# Patient Record
Sex: FEMALE | Race: WHITE | Employment: FULL TIME | URBAN - METROPOLITAN AREA
[De-identification: names, ages, dates, MRNs, and addresses within clinical notes are randomized per-mention and may not be internally consistent; named-entity substitution may affect disease eponyms.]

---

## 2019-02-20 LAB
CHOLESTEROL (EXTERNAL): 178 MG/DL (ref 100–199)
HDLC SERPL-MCNC: 55 MG/DL
HPV ABSTRACT: NORMAL
LDL CHOLESTEROL (EXTERNAL): 115 MG/DL
NON HDL CHOLESTEROL (EXTERNAL): 123 MG/DL
PAP-ABSTRACT: ABNORMAL
TRIGLYCERIDES (EXTERNAL): 40 MG/DL

## 2021-02-05 LAB
ABO (EXTERNAL): NORMAL
HEPATITIS B SURFACE ANTIGEN (EXTERNAL): NONREACTIVE
RH (EXTERNAL): POSITIVE
RUBELLA ANTIBODY IGG (EXTERNAL): POSITIVE

## 2021-02-12 LAB — CHLAMYDIA - HIM PATIENT REPORTED: NEGATIVE

## 2021-03-12 ENCOUNTER — MEDICAL CORRESPONDENCE (OUTPATIENT)
Dept: HEALTH INFORMATION MANAGEMENT | Facility: CLINIC | Age: 31
End: 2021-03-12

## 2021-03-12 ENCOUNTER — TRANSFERRED RECORDS (OUTPATIENT)
Dept: HEALTH INFORMATION MANAGEMENT | Facility: CLINIC | Age: 31
End: 2021-03-12

## 2021-03-12 ENCOUNTER — TRANSCRIBE ORDERS (OUTPATIENT)
Dept: MATERNAL FETAL MEDICINE | Facility: CLINIC | Age: 31
End: 2021-03-12

## 2021-03-12 DIAGNOSIS — O26.90 PREGNANCY RELATED CONDITION, ANTEPARTUM: Primary | ICD-10-CM

## 2021-03-15 ENCOUNTER — TRANSFERRED RECORDS (OUTPATIENT)
Dept: HEALTH INFORMATION MANAGEMENT | Facility: CLINIC | Age: 31
End: 2021-03-15

## 2021-03-29 ENCOUNTER — PRE VISIT (OUTPATIENT)
Dept: MATERNAL FETAL MEDICINE | Facility: CLINIC | Age: 31
End: 2021-03-29

## 2021-04-05 ENCOUNTER — OFFICE VISIT (OUTPATIENT)
Dept: MATERNAL FETAL MEDICINE | Facility: CLINIC | Age: 31
End: 2021-04-05
Attending: NURSE PRACTITIONER
Payer: COMMERCIAL

## 2021-04-05 ENCOUNTER — HOSPITAL ENCOUNTER (OUTPATIENT)
Dept: ULTRASOUND IMAGING | Facility: CLINIC | Age: 31
End: 2021-04-05
Attending: NURSE PRACTITIONER
Payer: COMMERCIAL

## 2021-04-05 DIAGNOSIS — O26.90 PREGNANCY RELATED CONDITION, ANTEPARTUM: ICD-10-CM

## 2021-04-05 DIAGNOSIS — O09.812 PREGNANCY RESULTING FROM IN VITRO FERTILIZATION IN SECOND TRIMESTER: Primary | ICD-10-CM

## 2021-04-05 PROCEDURE — 76811 OB US DETAILED SNGL FETUS: CPT

## 2021-04-05 PROCEDURE — 76811 OB US DETAILED SNGL FETUS: CPT | Mod: 26 | Performed by: OBSTETRICS & GYNECOLOGY

## 2021-04-05 NOTE — PROGRESS NOTES
Please see full imaging report from [facility] program under imaging tab.      Arcadio Garcia MD  Maternal Fetal Medicine

## 2021-05-06 ENCOUNTER — OFFICE VISIT (OUTPATIENT)
Dept: MATERNAL FETAL MEDICINE | Facility: CLINIC | Age: 31
End: 2021-05-06
Attending: NURSE PRACTITIONER
Payer: COMMERCIAL

## 2021-05-06 ENCOUNTER — HOSPITAL ENCOUNTER (OUTPATIENT)
Dept: ULTRASOUND IMAGING | Facility: CLINIC | Age: 31
End: 2021-05-06
Attending: NURSE PRACTITIONER
Payer: COMMERCIAL

## 2021-05-06 DIAGNOSIS — O09.812 PREGNANCY RESULTING FROM IN VITRO FERTILIZATION IN SECOND TRIMESTER: Primary | ICD-10-CM

## 2021-05-06 DIAGNOSIS — O26.90 PREGNANCY RELATED CONDITION, ANTEPARTUM: ICD-10-CM

## 2021-05-06 PROCEDURE — 93325 DOPPLER ECHO COLOR FLOW MAPG: CPT

## 2021-05-06 PROCEDURE — 93325 DOPPLER ECHO COLOR FLOW MAPG: CPT | Mod: 26 | Performed by: OBSTETRICS & GYNECOLOGY

## 2021-05-06 PROCEDURE — 76827 ECHO EXAM OF FETAL HEART: CPT | Mod: 26 | Performed by: OBSTETRICS & GYNECOLOGY

## 2021-05-06 PROCEDURE — 76825 ECHO EXAM OF FETAL HEART: CPT | Mod: 26 | Performed by: OBSTETRICS & GYNECOLOGY

## 2021-05-06 NOTE — PROGRESS NOTES
Please see the imaging tab for details of the ultrasound performed today.    Nadia Calhoun MD  Specialist in Maternal-Fetal Medicine

## 2021-08-06 LAB — GROUP B STREPTOCOCCUS (EXTERNAL): NEGATIVE

## 2021-08-13 ENCOUNTER — TRANSFERRED RECORDS (OUTPATIENT)
Dept: HEALTH INFORMATION MANAGEMENT | Facility: CLINIC | Age: 31
End: 2021-08-13

## 2021-08-13 LAB
ALT SERPL-CCNC: 50 IU/L (ref 12–68)
AST SERPL-CCNC: 24 IU/L (ref 12–37)
CREATININE (EXTERNAL): 0.57 MG/DL (ref 0.55–1.02)
GFR ESTIMATED (EXTERNAL): >60 ML/MIN
GFR ESTIMATED (IF AFRICAN AMERICAN) (EXTERNAL): >60 ML/MIN

## 2021-08-15 ENCOUNTER — HEALTH MAINTENANCE LETTER (OUTPATIENT)
Age: 31
End: 2021-08-15

## 2021-08-22 ENCOUNTER — LAB (OUTPATIENT)
Dept: LAB | Facility: CLINIC | Age: 31
End: 2021-08-22
Attending: OBSTETRICS & GYNECOLOGY
Payer: COMMERCIAL

## 2021-08-22 LAB — SARS-COV-2 RNA RESP QL NAA+PROBE: NEGATIVE

## 2021-08-22 PROCEDURE — U0005 INFEC AGEN DETEC AMPLI PROBE: HCPCS

## 2021-08-25 ENCOUNTER — HOSPITAL ENCOUNTER (INPATIENT)
Facility: CLINIC | Age: 31
LOS: 4 days | Discharge: HOME | End: 2021-08-29
Attending: OBSTETRICS & GYNECOLOGY | Admitting: OBSTETRICS & GYNECOLOGY
Payer: COMMERCIAL

## 2021-08-25 LAB
ABO/RH(D): NORMAL
ANTIBODY SCREEN: NEGATIVE
SPECIMEN EXPIRATION DATE: NORMAL

## 2021-08-25 PROCEDURE — 86780 TREPONEMA PALLIDUM: CPT | Performed by: OBSTETRICS & GYNECOLOGY

## 2021-08-25 PROCEDURE — 120N000001 HC R&B MED SURG/OB

## 2021-08-25 PROCEDURE — 250N000013 HC RX MED GY IP 250 OP 250 PS 637: Performed by: OBSTETRICS & GYNECOLOGY

## 2021-08-25 PROCEDURE — 36415 COLL VENOUS BLD VENIPUNCTURE: CPT | Performed by: OBSTETRICS & GYNECOLOGY

## 2021-08-25 PROCEDURE — 86900 BLOOD TYPING SEROLOGIC ABO: CPT | Performed by: OBSTETRICS & GYNECOLOGY

## 2021-08-25 RX ORDER — METHYLERGONOVINE MALEATE 0.2 MG/ML
200 INJECTION INTRAVENOUS
Status: DISCONTINUED | OUTPATIENT
Start: 2021-08-25 | End: 2021-08-27 | Stop reason: HOSPADM

## 2021-08-25 RX ORDER — NALOXONE HYDROCHLORIDE 0.4 MG/ML
0.4 INJECTION, SOLUTION INTRAMUSCULAR; INTRAVENOUS; SUBCUTANEOUS
Status: DISCONTINUED | OUTPATIENT
Start: 2021-08-25 | End: 2021-08-27 | Stop reason: HOSPADM

## 2021-08-25 RX ORDER — NALOXONE HYDROCHLORIDE 0.4 MG/ML
0.2 INJECTION, SOLUTION INTRAMUSCULAR; INTRAVENOUS; SUBCUTANEOUS
Status: DISCONTINUED | OUTPATIENT
Start: 2021-08-25 | End: 2021-08-27 | Stop reason: HOSPADM

## 2021-08-25 RX ORDER — LIDOCAINE 40 MG/G
CREAM TOPICAL
Status: DISCONTINUED | OUTPATIENT
Start: 2021-08-25 | End: 2021-08-27 | Stop reason: HOSPADM

## 2021-08-25 RX ORDER — OXYTOCIN/0.9 % SODIUM CHLORIDE 30/500 ML
100-340 PLASTIC BAG, INJECTION (ML) INTRAVENOUS CONTINUOUS PRN
Status: DISCONTINUED | OUTPATIENT
Start: 2021-08-25 | End: 2021-08-27

## 2021-08-25 RX ORDER — MISOPROSTOL 100 UG/1
25 TABLET ORAL
Status: COMPLETED | OUTPATIENT
Start: 2021-08-25 | End: 2021-08-26

## 2021-08-25 RX ORDER — OXYTOCIN 10 [USP'U]/ML
10 INJECTION, SOLUTION INTRAMUSCULAR; INTRAVENOUS
Status: DISCONTINUED | OUTPATIENT
Start: 2021-08-25 | End: 2021-08-27 | Stop reason: HOSPADM

## 2021-08-25 RX ORDER — PROCHLORPERAZINE MALEATE 5 MG
10 TABLET ORAL EVERY 6 HOURS PRN
Status: DISCONTINUED | OUTPATIENT
Start: 2021-08-25 | End: 2021-08-27 | Stop reason: HOSPADM

## 2021-08-25 RX ORDER — METOCLOPRAMIDE 10 MG/1
10 TABLET ORAL EVERY 6 HOURS PRN
Status: DISCONTINUED | OUTPATIENT
Start: 2021-08-25 | End: 2021-08-27 | Stop reason: HOSPADM

## 2021-08-25 RX ORDER — ASPIRIN 81 MG/1
81 TABLET, CHEWABLE ORAL EVERY 24 HOURS
Status: ON HOLD | COMMUNITY
End: 2021-08-29

## 2021-08-25 RX ORDER — MISOPROSTOL 200 UG/1
400 TABLET ORAL
Status: DISCONTINUED | OUTPATIENT
Start: 2021-08-25 | End: 2021-08-27 | Stop reason: HOSPADM

## 2021-08-25 RX ORDER — METOCLOPRAMIDE HYDROCHLORIDE 5 MG/ML
10 INJECTION INTRAMUSCULAR; INTRAVENOUS EVERY 6 HOURS PRN
Status: DISCONTINUED | OUTPATIENT
Start: 2021-08-25 | End: 2021-08-27 | Stop reason: HOSPADM

## 2021-08-25 RX ORDER — CARBOPROST TROMETHAMINE 250 UG/ML
250 INJECTION, SOLUTION INTRAMUSCULAR
Status: DISCONTINUED | OUTPATIENT
Start: 2021-08-25 | End: 2021-08-27 | Stop reason: HOSPADM

## 2021-08-25 RX ORDER — OXYTOCIN 10 [USP'U]/ML
10 INJECTION, SOLUTION INTRAMUSCULAR; INTRAVENOUS
Status: DISCONTINUED | OUTPATIENT
Start: 2021-08-25 | End: 2021-08-27

## 2021-08-25 RX ORDER — PROCHLORPERAZINE 25 MG
25 SUPPOSITORY, RECTAL RECTAL EVERY 12 HOURS PRN
Status: DISCONTINUED | OUTPATIENT
Start: 2021-08-25 | End: 2021-08-27 | Stop reason: HOSPADM

## 2021-08-25 RX ORDER — TRANEXAMIC ACID 10 MG/ML
1 INJECTION, SOLUTION INTRAVENOUS EVERY 30 MIN PRN
Status: DISCONTINUED | OUTPATIENT
Start: 2021-08-25 | End: 2021-08-27 | Stop reason: HOSPADM

## 2021-08-25 RX ORDER — OXYTOCIN/0.9 % SODIUM CHLORIDE 30/500 ML
340 PLASTIC BAG, INJECTION (ML) INTRAVENOUS CONTINUOUS PRN
Status: DISCONTINUED | OUTPATIENT
Start: 2021-08-25 | End: 2021-08-27 | Stop reason: HOSPADM

## 2021-08-25 RX ORDER — ONDANSETRON 4 MG/1
4 TABLET, ORALLY DISINTEGRATING ORAL EVERY 6 HOURS PRN
Status: DISCONTINUED | OUTPATIENT
Start: 2021-08-25 | End: 2021-08-27 | Stop reason: HOSPADM

## 2021-08-25 RX ORDER — KETOROLAC TROMETHAMINE 30 MG/ML
30 INJECTION, SOLUTION INTRAMUSCULAR; INTRAVENOUS
Status: DISCONTINUED | OUTPATIENT
Start: 2021-08-25 | End: 2021-08-27

## 2021-08-25 RX ORDER — ONDANSETRON 2 MG/ML
4 INJECTION INTRAMUSCULAR; INTRAVENOUS EVERY 6 HOURS PRN
Status: DISCONTINUED | OUTPATIENT
Start: 2021-08-25 | End: 2021-08-27 | Stop reason: HOSPADM

## 2021-08-25 RX ORDER — MISOPROSTOL 200 UG/1
800 TABLET ORAL
Status: DISCONTINUED | OUTPATIENT
Start: 2021-08-25 | End: 2021-08-27 | Stop reason: HOSPADM

## 2021-08-25 RX ORDER — LABETALOL 100 MG/1
100 TABLET, FILM COATED ORAL 2 TIMES DAILY
Status: ON HOLD | COMMUNITY
End: 2021-09-04

## 2021-08-25 RX ORDER — IBUPROFEN 600 MG/1
600 TABLET, FILM COATED ORAL
Status: DISCONTINUED | OUTPATIENT
Start: 2021-08-25 | End: 2021-08-27

## 2021-08-25 RX ORDER — HYDROXYZINE HYDROCHLORIDE 50 MG/1
50-100 TABLET, FILM COATED ORAL
Status: COMPLETED | OUTPATIENT
Start: 2021-08-25 | End: 2021-08-25

## 2021-08-25 RX ADMIN — MISOPROSTOL 25 MCG: 100 TABLET ORAL at 20:49

## 2021-08-25 RX ADMIN — MISOPROSTOL 25 MCG: 100 TABLET ORAL at 22:59

## 2021-08-25 RX ADMIN — HYDROXYZINE HYDROCHLORIDE 100 MG: 50 TABLET, FILM COATED ORAL at 23:02

## 2021-08-26 LAB
ALT SERPL W P-5'-P-CCNC: 41 U/L (ref 0–50)
ANION GAP SERPL CALCULATED.3IONS-SCNC: 4 MMOL/L (ref 3–14)
AST SERPL W P-5'-P-CCNC: 25 U/L (ref 0–45)
BUN SERPL-MCNC: 6 MG/DL (ref 7–30)
CALCIUM SERPL-MCNC: 8.5 MG/DL (ref 8.5–10.1)
CHLORIDE BLD-SCNC: 106 MMOL/L (ref 94–109)
CO2 SERPL-SCNC: 26 MMOL/L (ref 20–32)
CREAT SERPL-MCNC: 0.66 MG/DL (ref 0.52–1.04)
ERYTHROCYTE [DISTWIDTH] IN BLOOD BY AUTOMATED COUNT: 12.9 % (ref 10–15)
GFR SERPL CREATININE-BSD FRML MDRD: >90 ML/MIN/1.73M2
GLUCOSE BLD-MCNC: 71 MG/DL (ref 70–99)
HCT VFR BLD AUTO: 37.5 % (ref 35–47)
HGB BLD-MCNC: 12.7 G/DL (ref 11.7–15.7)
MCH RBC QN AUTO: 30.5 PG (ref 26.5–33)
MCHC RBC AUTO-ENTMCNC: 33.9 G/DL (ref 31.5–36.5)
MCV RBC AUTO: 90 FL (ref 78–100)
PLATELET # BLD AUTO: 191 10E3/UL (ref 150–450)
POTASSIUM BLD-SCNC: 4.1 MMOL/L (ref 3.4–5.3)
RBC # BLD AUTO: 4.16 10E6/UL (ref 3.8–5.2)
SODIUM SERPL-SCNC: 136 MMOL/L (ref 133–144)
T PALLIDUM AB SER QL: NONREACTIVE
WBC # BLD AUTO: 10.6 10E3/UL (ref 4–11)

## 2021-08-26 PROCEDURE — 80048 BASIC METABOLIC PNL TOTAL CA: CPT | Performed by: OBSTETRICS & GYNECOLOGY

## 2021-08-26 PROCEDURE — 84460 ALANINE AMINO (ALT) (SGPT): CPT | Performed by: OBSTETRICS & GYNECOLOGY

## 2021-08-26 PROCEDURE — 250N000013 HC RX MED GY IP 250 OP 250 PS 637: Performed by: OBSTETRICS & GYNECOLOGY

## 2021-08-26 PROCEDURE — 120N000001 HC R&B MED SURG/OB

## 2021-08-26 PROCEDURE — 36415 COLL VENOUS BLD VENIPUNCTURE: CPT | Performed by: OBSTETRICS & GYNECOLOGY

## 2021-08-26 PROCEDURE — 85027 COMPLETE CBC AUTOMATED: CPT | Performed by: OBSTETRICS & GYNECOLOGY

## 2021-08-26 PROCEDURE — 258N000003 HC RX IP 258 OP 636: Performed by: OBSTETRICS & GYNECOLOGY

## 2021-08-26 PROCEDURE — 84450 TRANSFERASE (AST) (SGOT): CPT | Performed by: OBSTETRICS & GYNECOLOGY

## 2021-08-26 PROCEDURE — 250N000009 HC RX 250: Performed by: OBSTETRICS & GYNECOLOGY

## 2021-08-26 RX ORDER — LIDOCAINE 40 MG/G
CREAM TOPICAL
Status: DISCONTINUED | OUTPATIENT
Start: 2021-08-26 | End: 2021-08-27 | Stop reason: HOSPADM

## 2021-08-26 RX ORDER — LABETALOL 100 MG/1
100 TABLET, FILM COATED ORAL EVERY 12 HOURS SCHEDULED
Status: DISCONTINUED | OUTPATIENT
Start: 2021-08-26 | End: 2021-08-27

## 2021-08-26 RX ORDER — SODIUM CHLORIDE, SODIUM LACTATE, POTASSIUM CHLORIDE, CALCIUM CHLORIDE 600; 310; 30; 20 MG/100ML; MG/100ML; MG/100ML; MG/100ML
INJECTION, SOLUTION INTRAVENOUS CONTINUOUS
Status: DISCONTINUED | OUTPATIENT
Start: 2021-08-26 | End: 2021-08-27 | Stop reason: HOSPADM

## 2021-08-26 RX ORDER — OXYTOCIN/0.9 % SODIUM CHLORIDE 30/500 ML
1-24 PLASTIC BAG, INJECTION (ML) INTRAVENOUS CONTINUOUS
Status: DISCONTINUED | OUTPATIENT
Start: 2021-08-26 | End: 2021-08-27 | Stop reason: HOSPADM

## 2021-08-26 RX ADMIN — SODIUM CHLORIDE, POTASSIUM CHLORIDE, SODIUM LACTATE AND CALCIUM CHLORIDE: 600; 310; 30; 20 INJECTION, SOLUTION INTRAVENOUS at 22:46

## 2021-08-26 RX ADMIN — LABETALOL HYDROCHLORIDE 100 MG: 100 TABLET, FILM COATED ORAL at 19:51

## 2021-08-26 RX ADMIN — MISOPROSTOL 25 MCG: 100 TABLET ORAL at 03:06

## 2021-08-26 RX ADMIN — MISOPROSTOL 25 MCG: 100 TABLET ORAL at 13:29

## 2021-08-26 RX ADMIN — MISOPROSTOL 25 MCG: 100 TABLET ORAL at 04:54

## 2021-08-26 RX ADMIN — MISOPROSTOL 25 MCG: 100 TABLET ORAL at 11:25

## 2021-08-26 RX ADMIN — Medication 2 MILLI-UNITS/MIN: at 23:02

## 2021-08-26 RX ADMIN — MISOPROSTOL 25 MCG: 100 TABLET ORAL at 09:27

## 2021-08-26 RX ADMIN — MISOPROSTOL 25 MCG: 100 TABLET ORAL at 19:51

## 2021-08-26 RX ADMIN — MISOPROSTOL 25 MCG: 100 TABLET ORAL at 17:43

## 2021-08-26 RX ADMIN — LABETALOL HYDROCHLORIDE 100 MG: 100 TABLET, FILM COATED ORAL at 08:10

## 2021-08-26 RX ADMIN — MISOPROSTOL 25 MCG: 100 TABLET ORAL at 00:59

## 2021-08-26 RX ADMIN — MISOPROSTOL 25 MCG: 100 TABLET ORAL at 15:27

## 2021-08-26 RX ADMIN — MISOPROSTOL 25 MCG: 100 TABLET ORAL at 07:16

## 2021-08-26 ASSESSMENT — ACTIVITIES OF DAILY LIVING (ADL)
FALL_HISTORY_WITHIN_LAST_SIX_MONTHS: NO
TOILETING_ISSUES: NO

## 2021-08-26 NOTE — PLAN OF CARE
Assumed care at 715, report received from Jacqueline BULLOCK.  Valerie is a 31 year old, , 38w6d, being induced for chronic HTN. GBS negative, Covid negative. Patient denies headache, epigastric pain, visual disturbances.  Membranes intact, received 6th dose of po cytotec at 0716.  Pt plans to call  when labor progresse and eventually get an epidural.

## 2021-08-26 NOTE — PLAN OF CARE
Data: Patient admitted to room 213 at 1945. Patient is a . Prenatal record reviewed.   OB History    Para Term  AB Living   1 0 0 0 0 0   SAB TAB Ectopic Multiple Live Births   0 0 0 0 0      # Outcome Date GA Lbr Stephan/2nd Weight Sex Delivery Anes PTL Lv   1 Current            .  Medical History:   Past Medical History:   Diagnosis Date     Hypertension     Chronic HTN   .  Gestational age 38w5d. Vital signs per doc flowsheet. Fetal movement present. Patient reports Induction Of Labor   as reason for admission. Support persons Geena present.  Action:  Verbal consent for EFM, denies skin reactions to adhesives, nehemiah monitor applied. Admission assessment completed. SVE /-3. Patient and support persons educated on labor process. Patient instructed to report change in fetal movement, contractions, vaginal leaking of fluid or bleeding, abdominal pain, or any concerns related to the pregnancy to her nurse/physician. Patient oriented to room, call light in reach.   Response: Dr. Chicas informed of patient arrival. Plan per provider is Oral cytotec x12 doses. Hypertensive labs ordered for morning. Patient verbalized understanding of education and verbalized agreement with plan. Patient coping with labor via rest.

## 2021-08-26 NOTE — PLAN OF CARE
Problem: Adult Inpatient Plan of Care  Goal: Plan of Care Review  Outcome: No Change  Goal: Patient-Specific Goal (Individualized)  Outcome: No Change  Goal: Absence of Hospital-Acquired Illness or Injury  Outcome: No Change  Goal: Optimal Comfort and Wellbeing  Outcome: No Change  Intervention: Provide Person-Centered Care  Recent Flowsheet Documentation  Taken 8/26/2021 1100 by Nora Salvador RN  Trust Relationship/Rapport:   care explained   choices provided   empathic listening provided   questions answered   questions encouraged   reassurance provided   thoughts/feelings acknowledged   emotional support provided  Taken 8/26/2021 0800 by Nora Salvador RN  Trust Relationship/Rapport:   care explained   choices provided   empathic listening provided   questions answered   questions encouraged   reassurance provided   thoughts/feelings acknowledged   emotional support provided  Goal: Readiness for Transition of Care  Outcome: No Change     Problem: Bleeding (Labor)  Goal: Hemostasis  Outcome: No Change     Problem: Change in Fetal Wellbeing (Labor)  Goal: Stable Fetal Wellbeing  Outcome: No Change     Problem: Delayed Labor Progression (Labor)  Goal: Effective Progression to Delivery  Outcome: No Change     Problem: Infection (Labor)  Goal: Absence of Infection Signs and Symptoms  Outcome: No Change     Problem: Labor Pain (Labor)  Goal: Acceptable Pain Control  Outcome: No Change     Problem: Uterine Tachysystole (Labor)  Goal: Normal Uterine Contraction Pattern  Outcome: No Change     Patient received 4 doses of po cytotec.

## 2021-08-26 NOTE — H&P
"OB Brief Admit H&P    No significant change in general health status based on examination of the patient, review of Nursing Admission Database and prenatal record.    Pt is a 31 year old  @ 38w6d who presented to L&D for IOL due to CHTN. S/p 6 doses of oral cytotec overnight and feeling cramping. Denies LOF or VB. Baby active. Denies HA, blurry vision, RUQ pain.    Patient's prenatal course has been complicated by:  1. CTHN - on labetalol 100 mg BID  2. IVF - donor egg from wife  3. H/o ureteral reimplantation surgery - low abdominal scar, prefers we use this if  needed.  4. Elevated 1 hour, normal 3 hour.    Prenatal Labs:    Blood type A+  Rubella immune  , normal 3 hour  GBS  Negative 21    EFW: 7 lbs    BP (!) 144/80   Temp 98.4  F (36.9  C) (Temporal)   Resp 17   Ht 1.753 m (5' 9\")   Wt 90.7 kg (200 lb)   BMI 29.53 kg/m    EFM:  140, moderate variability, ++accels, no decels (Category )  [place]: Q1-4  SVE: 0/60%/-2  Membranes:  intact  Lab Results   Component Value Date    WBC 10.6 2021    HGB 12.7 2021    HCT 37.5 2021    MCV 90 2021     2021     Lab Results   Component Value Date    CR 0.66 2021     Lab Results   Component Value Date    AST 25 2021    ALT 41 2021     Most recent P/C ratio 21 too low to calculate    COVID PCR negative 21    Assessment:  31 year old  @ 38w6d admitted for IOL secondary to CHTN on medication.     Plan:  1. Admit to labor and delivery   2. Labor induction  - Continue ripening with oral cytotec, progress has been made overnight  - Plan AROM / pitocin when appropriate  - Ok for epidural  - Anticipate   3. CHTN  - continue home medications  - No evidence of superimposed preeclampsia at this time.  4. ID  - Neg GBS  - Neg Covid   - S/p Tdap & Covid vaccines this pregnancy.    Jo-Ann Weiss MD  2021  9:51 AM      "

## 2021-08-26 NOTE — PROGRESS NOTES
"OB Progress Note  2021  3:02 PM    S:  Pt with no medication for  pain control. Was just up to the bathroom and then felt leaking fluid - blood tinged.  No other complaints. Receiving oral cytotec x 9.      O:  BP (!) 141/94   Temp 98  F (36.7  C) (Temporal)   Resp 18   Ht 1.753 m (5' 9\")   Wt 90.7 kg (200 lb)   BMI 29.53 kg/m     Vitals:    21 0727 21 0747 21 0835 21 1123   BP: (!) 144/80   (!) 141/94   Resp: 17   18   Temp: 98.4  F (36.9  C)   98  F (36.7  C)   TempSrc: Temporal   Temporal   Weight:  90.7 kg (200 lb)     Height:   1.753 m (5' 9\")      EFM: baseline 140, accelerations present, variable decelerations, moderate variability; Category 2  [place]:  Ctx q2-3 min  SVE:  FT/60%/-2, posterior  Membranes: SROM, clear blood tinged at 2:55 pm    Pitocin at 0 mU/min    A/P:  31 year old  @38w6d admitted for IOL secondary to CHTN.    1.  Routine cares  2.  Now s/p SROM. Transition to pitocin after 12 doses of oral cytotec. Ok for epidural.  3.  CHTN   - continue home medications  - No evidence of superimposed preeclampsia at this time.  4. Anticipate     Jo-Ann Weiss MD    "

## 2021-08-27 ENCOUNTER — ANESTHESIA (OUTPATIENT)
Dept: OBGYN | Facility: CLINIC | Age: 31
End: 2021-08-27
Payer: COMMERCIAL

## 2021-08-27 ENCOUNTER — ANESTHESIA EVENT (OUTPATIENT)
Dept: OBGYN | Facility: CLINIC | Age: 31
End: 2021-08-27
Payer: COMMERCIAL

## 2021-08-27 PROCEDURE — 120N000012 HC R&B POSTPARTUM

## 2021-08-27 PROCEDURE — 250N000011 HC RX IP 250 OP 636: Performed by: ANESTHESIOLOGY

## 2021-08-27 PROCEDURE — 00HU33Z INSERTION OF INFUSION DEVICE INTO SPINAL CANAL, PERCUTANEOUS APPROACH: ICD-10-PCS | Performed by: ANESTHESIOLOGY

## 2021-08-27 PROCEDURE — 250N000013 HC RX MED GY IP 250 OP 250 PS 637: Performed by: OBSTETRICS & GYNECOLOGY

## 2021-08-27 PROCEDURE — 258N000003 HC RX IP 258 OP 636: Performed by: OBSTETRICS & GYNECOLOGY

## 2021-08-27 PROCEDURE — 722N000001 HC LABOR CARE VAGINAL DELIVERY SINGLE

## 2021-08-27 PROCEDURE — 3E0R3BZ INTRODUCTION OF ANESTHETIC AGENT INTO SPINAL CANAL, PERCUTANEOUS APPROACH: ICD-10-PCS | Performed by: ANESTHESIOLOGY

## 2021-08-27 PROCEDURE — 250N000009 HC RX 250: Performed by: OBSTETRICS & GYNECOLOGY

## 2021-08-27 PROCEDURE — 0HQ9XZZ REPAIR PERINEUM SKIN, EXTERNAL APPROACH: ICD-10-PCS | Performed by: OBSTETRICS & GYNECOLOGY

## 2021-08-27 RX ORDER — FENTANYL CITRATE 50 UG/ML
100 INJECTION, SOLUTION INTRAMUSCULAR; INTRAVENOUS ONCE
Status: DISCONTINUED | OUTPATIENT
Start: 2021-08-27 | End: 2021-08-27 | Stop reason: HOSPADM

## 2021-08-27 RX ORDER — OXYTOCIN 10 [USP'U]/ML
10 INJECTION, SOLUTION INTRAMUSCULAR; INTRAVENOUS
Status: DISCONTINUED | OUTPATIENT
Start: 2021-08-27 | End: 2021-08-29 | Stop reason: HOSPADM

## 2021-08-27 RX ORDER — OXYTOCIN/0.9 % SODIUM CHLORIDE 30/500 ML
340 PLASTIC BAG, INJECTION (ML) INTRAVENOUS CONTINUOUS PRN
Status: DISCONTINUED | OUTPATIENT
Start: 2021-08-27 | End: 2021-08-29 | Stop reason: HOSPADM

## 2021-08-27 RX ORDER — MISOPROSTOL 200 UG/1
400 TABLET ORAL
Status: DISCONTINUED | OUTPATIENT
Start: 2021-08-27 | End: 2021-08-29 | Stop reason: HOSPADM

## 2021-08-27 RX ORDER — METHYLERGONOVINE MALEATE 0.2 MG/ML
200 INJECTION INTRAVENOUS
Status: DISCONTINUED | OUTPATIENT
Start: 2021-08-27 | End: 2021-08-29 | Stop reason: HOSPADM

## 2021-08-27 RX ORDER — ROPIVACAINE HYDROCHLORIDE 2 MG/ML
10 INJECTION, SOLUTION EPIDURAL; INFILTRATION; PERINEURAL ONCE
Status: DISCONTINUED | OUTPATIENT
Start: 2021-08-27 | End: 2021-08-27 | Stop reason: HOSPADM

## 2021-08-27 RX ORDER — IBUPROFEN 400 MG/1
800 TABLET, FILM COATED ORAL EVERY 6 HOURS PRN
Status: DISCONTINUED | OUTPATIENT
Start: 2021-08-27 | End: 2021-08-29 | Stop reason: HOSPADM

## 2021-08-27 RX ORDER — ACETAMINOPHEN 325 MG/1
975 TABLET ORAL EVERY 4 HOURS PRN
Status: DISCONTINUED | OUTPATIENT
Start: 2021-08-27 | End: 2021-08-27

## 2021-08-27 RX ORDER — MISOPROSTOL 200 UG/1
800 TABLET ORAL
Status: DISCONTINUED | OUTPATIENT
Start: 2021-08-27 | End: 2021-08-29 | Stop reason: HOSPADM

## 2021-08-27 RX ORDER — FENTANYL CITRATE 50 UG/ML
INJECTION, SOLUTION INTRAMUSCULAR; INTRAVENOUS
Status: COMPLETED | OUTPATIENT
Start: 2021-08-27 | End: 2021-08-27

## 2021-08-27 RX ORDER — CARBOPROST TROMETHAMINE 250 UG/ML
250 INJECTION, SOLUTION INTRAMUSCULAR
Status: DISCONTINUED | OUTPATIENT
Start: 2021-08-27 | End: 2021-08-29 | Stop reason: HOSPADM

## 2021-08-27 RX ORDER — TRANEXAMIC ACID 10 MG/ML
1 INJECTION, SOLUTION INTRAVENOUS EVERY 30 MIN PRN
Status: DISCONTINUED | OUTPATIENT
Start: 2021-08-27 | End: 2021-08-29 | Stop reason: HOSPADM

## 2021-08-27 RX ORDER — ACETAMINOPHEN 325 MG/1
650 TABLET ORAL EVERY 4 HOURS PRN
Status: DISCONTINUED | OUTPATIENT
Start: 2021-08-27 | End: 2021-08-29 | Stop reason: HOSPADM

## 2021-08-27 RX ORDER — BISACODYL 10 MG
10 SUPPOSITORY, RECTAL RECTAL DAILY PRN
Status: DISCONTINUED | OUTPATIENT
Start: 2021-08-27 | End: 2021-08-29 | Stop reason: HOSPADM

## 2021-08-27 RX ORDER — DOCUSATE SODIUM 100 MG/1
100 CAPSULE, LIQUID FILLED ORAL DAILY
Status: DISCONTINUED | OUTPATIENT
Start: 2021-08-27 | End: 2021-08-29 | Stop reason: HOSPADM

## 2021-08-27 RX ORDER — MODIFIED LANOLIN
OINTMENT (GRAM) TOPICAL
Status: DISCONTINUED | OUTPATIENT
Start: 2021-08-27 | End: 2021-08-29 | Stop reason: HOSPADM

## 2021-08-27 RX ORDER — HYDROCORTISONE 2.5 %
CREAM (GRAM) TOPICAL 3 TIMES DAILY PRN
Status: DISCONTINUED | OUTPATIENT
Start: 2021-08-27 | End: 2021-08-29 | Stop reason: HOSPADM

## 2021-08-27 RX ORDER — ROPIVACAINE HYDROCHLORIDE 2 MG/ML
INJECTION, SOLUTION EPIDURAL; INFILTRATION; PERINEURAL
Status: COMPLETED | OUTPATIENT
Start: 2021-08-27 | End: 2021-08-27

## 2021-08-27 RX ORDER — NALBUPHINE HYDROCHLORIDE 10 MG/ML
2.5-5 INJECTION, SOLUTION INTRAMUSCULAR; INTRAVENOUS; SUBCUTANEOUS EVERY 6 HOURS PRN
Status: DISCONTINUED | OUTPATIENT
Start: 2021-08-27 | End: 2021-08-27

## 2021-08-27 RX ORDER — EPHEDRINE SULFATE 50 MG/ML
5 INJECTION, SOLUTION INTRAMUSCULAR; INTRAVENOUS; SUBCUTANEOUS
Status: DISCONTINUED | OUTPATIENT
Start: 2021-08-27 | End: 2021-08-27 | Stop reason: HOSPADM

## 2021-08-27 RX ORDER — LABETALOL 100 MG/1
100 TABLET, FILM COATED ORAL EVERY 12 HOURS SCHEDULED
Status: DISCONTINUED | OUTPATIENT
Start: 2021-08-27 | End: 2021-08-29 | Stop reason: HOSPADM

## 2021-08-27 RX ADMIN — SODIUM CHLORIDE, POTASSIUM CHLORIDE, SODIUM LACTATE AND CALCIUM CHLORIDE: 600; 310; 30; 20 INJECTION, SOLUTION INTRAVENOUS at 18:12

## 2021-08-27 RX ADMIN — Medication 12 ML/HR: at 17:45

## 2021-08-27 RX ADMIN — DOCUSATE SODIUM 100 MG: 100 CAPSULE, LIQUID FILLED ORAL at 22:25

## 2021-08-27 RX ADMIN — TRANEXAMIC ACID 1 G: 10 INJECTION, SOLUTION INTRAVENOUS at 19:40

## 2021-08-27 RX ADMIN — ROPIVACAINE HYDROCHLORIDE 10 ML: 2 INJECTION, SOLUTION EPIDURAL; INFILTRATION at 01:25

## 2021-08-27 RX ADMIN — LABETALOL HYDROCHLORIDE 100 MG: 100 TABLET, FILM COATED ORAL at 07:47

## 2021-08-27 RX ADMIN — SODIUM CHLORIDE, POTASSIUM CHLORIDE, SODIUM LACTATE AND CALCIUM CHLORIDE: 600; 310; 30; 20 INJECTION, SOLUTION INTRAVENOUS at 01:40

## 2021-08-27 RX ADMIN — Medication 12 ML/HR: at 09:18

## 2021-08-27 RX ADMIN — SODIUM CHLORIDE, POTASSIUM CHLORIDE, SODIUM LACTATE AND CALCIUM CHLORIDE 1000 ML: 600; 310; 30; 20 INJECTION, SOLUTION INTRAVENOUS at 00:57

## 2021-08-27 RX ADMIN — SODIUM CHLORIDE, POTASSIUM CHLORIDE, SODIUM LACTATE AND CALCIUM CHLORIDE: 600; 310; 30; 20 INJECTION, SOLUTION INTRAVENOUS at 09:41

## 2021-08-27 RX ADMIN — LABETALOL HYDROCHLORIDE 100 MG: 100 TABLET, FILM COATED ORAL at 22:25

## 2021-08-27 RX ADMIN — FENTANYL CITRATE 100 MCG: 50 INJECTION, SOLUTION INTRAMUSCULAR; INTRAVENOUS at 01:25

## 2021-08-27 RX ADMIN — Medication 12 ML/HR: at 01:33

## 2021-08-27 RX ADMIN — Medication 100 ML/HR: at 20:00

## 2021-08-27 RX ADMIN — ACETAMINOPHEN 975 MG: 325 TABLET, FILM COATED ORAL at 11:11

## 2021-08-27 RX ADMIN — IBUPROFEN 800 MG: 400 TABLET ORAL at 21:16

## 2021-08-27 RX ADMIN — ACETAMINOPHEN 650 MG: 325 TABLET, FILM COATED ORAL at 21:16

## 2021-08-27 NOTE — PROGRESS NOTES
"OB Progress Note  2021  6:11 PM    S:  Pushing on side with epidural.     O:  /72   Temp 98.2  F (36.8  C) (Temporal)   Resp 16   Ht 1.753 m (5' 9\")   Wt 90.7 kg (200 lb)   SpO2 96%   BMI 29.53 kg/m    EFM: baseline 140, accelerations absent, no decelerations on her side, did have recurrent decels when pushing on her back, moderate variability; Category II  [place]:  Ctx q2-3 min  SVE:  10/100%/+2  Membranes: ruptured at 2:55pm on 21    Pitocin at 6 mU/min    A/P:  31 year old  @39w0d admitted for IOL for chronic HTN  1.  Routine cares, OK to continue pushing at this time  2.  CHTN: BP's WNL  3.  Anticipate     Joi Parker MD    "

## 2021-08-27 NOTE — PROVIDER NOTIFICATION
08/26/21 2218   Provider Notification   Provider Name/Title Dr Chung   Method of Notification Electronic Page   Request Evaluate - Remote   Notification Reason Status Update     Spoke with MD on the phone. PO cytotec order now complete. Patient off the monitor to shower. Orders received to start pitocin.

## 2021-08-27 NOTE — ANESTHESIA PROCEDURE NOTES
Epidural catheter Procedure Note  Pre-Procedure   Staff -        Anesthesiologist:  Ramandeep Lovell MD       Performed By: anesthesiologist       Location: OB       Pre-Anesthestic Checklist: patient identified, IV checked, site marked, risks and benefits discussed, informed consent, monitors and equipment checked, pre-op evaluation and at physician/surgeon's request  Timeout:       Correct Patient: Yes        Correct Procedure: Yes        Correct Site: Yes        Correct Position: Yes   Procedure Documentation  Procedure: epidural catheter       Patient Position: sitting       Skin prep: Betadine       Local skin infiltrated with 1 mL of 1% lidocaine.        Insertion Site: L2-3. (midline approach).       Technique: LORT saline and LORT air        Needle Type: Touhy needle       Needle Gauge: 17.        Needle Length (Inches): 3.5        Catheter: 19 G.         Catheter threaded easily.        # of attempts: 1 and  # of redirects:     Assessment/Narrative         Paresthesias: No.       Test dose of 3 mL lidocaine 1.5% w/ 1:200,000 epinephrine at 01:23 CDT.         Test dose negative, 3 minutes after injection, for signs of intravascular, subdural, or intrathecal injection.       Insertion/Infusion Method: LORT saline and LORT air       Aspiration negative for Heme or CSF via Epidural Catheter.    Medication(s) Administered   0.2% Ropivacaine (Epidural), 10 mL  Fentanyl PF (Epidural), 100 mcg  Medication Administration Time: 8/27/2021 1:25 AM    Comments:  Pre-procedure time out completed. Patient in sitting position, the lumbar spine was prepped and draped in sterile fashion. The L2/L3 interspace was identified and local anesthetic was injected for local skin infiltration. A 17 G touhy needle was advanced to the epidural space which was confirmed with the loss of resistance technique at 4.5 cm. A catheter was then advanced easily into the epidural space. The catheter was left at 8.5 cm at the skin. Negative  aspiration of blood and CSF was confirmed. A test dose of 1.5% lidocaine with 1:200,000 epinephrine was injected through the catheter and was negative for intravascular injection. The site was covered with sterile tegaderm and the catheter was secured with tape.

## 2021-08-27 NOTE — PROGRESS NOTES
"OB Progress Note  2021  8:40 AM    S:  Pt with good pain control.  No other complaints.      O:  /59   Temp 98.4  F (36.9  C) (Temporal)   Resp 16   Ht 1.753 m (5' 9\")   Wt 90.7 kg (200 lb)   SpO2 95%   BMI 29.53 kg/m    EFM: baseline 140, accelerations +, none decelerations, mod variability; Category 1  [place]:  Ctx q2 min  SVE:  5/80%/0  Membranes: SROM     Pitocin at 10 mU/min    A/P:  31 year old  @39w0d admitted with Chtn  1.  Routine cares: continue Induction process, monitor cervical change, we can consider internal uterine monitoring but not necessary if we can see the pattern well externally.   2.  bp stable   3.  Anticipate     Darwin Chicas DO    "

## 2021-08-27 NOTE — ANESTHESIA PREPROCEDURE EVALUATION
Anesthesia Pre-Procedure Evaluation    Patient: Valerie Holder   MRN: [id number] : 1990        Preoperative Diagnosis: * No surgery found *   Procedure :      Past Medical History:   Diagnosis Date     Hypertension     Chronic HTN      History reviewed. No pertinent surgical history.   No Known Allergies   Social History     Tobacco Use     Smoking status: Never Smoker   Substance Use Topics     Alcohol use: Not Currently      Wt Readings from Last 1 Encounters:   21 90.7 kg (200 lb)        No Known Allergies  Prior to Admission medications    Medication Sig Start Date End Date Taking? Authorizing Provider   aspirin (ASA) 81 MG chewable tablet 81 mg every 24 hours   Yes Reported, Patient   Cholecalciferol (VITAMIN D3) 25 MCG (1000 UT) CAPS Vitamin D3 21  Yes Reported, Patient   labetalol (NORMODYNE) 100 MG tablet 100 mg 2 times daily   Yes Reported, Patient       Anesthesia Evaluation            ROS/MED HX  ENT/Pulmonary:  - neg pulmonary ROS     Neurologic:  - neg neurologic ROS     Cardiovascular:     (+) hypertension--PIH ---    METS/Exercise Tolerance:     Hematologic:  - neg hematologic  ROS     Musculoskeletal:       GI/Hepatic:  - neg GI/hepatic ROS     Renal/Genitourinary:       Endo:  - neg endo ROS     Psychiatric/Substance Use:  - neg psychiatric ROS     Infectious Disease:       Malignancy:       Other:            Physical Exam    Airway        Mallampati: II   TM distance: > 3 FB   Neck ROM: full   Mouth opening: > 3 cm    Respiratory Devices and Support         Dental  no notable dental history         Cardiovascular   cardiovascular exam normal          Pulmonary   pulmonary exam normal                OUTSIDE LABS:  CBC:   Lab Results   Component Value Date    WBC 10.6 2021    HGB 12.7 2021    HCT 37.5 2021     2021     BMP:   Lab Results   Component Value Date     2021    POTASSIUM 4.1 2021    CHLORIDE 106 2021    CO2  26 08/26/2021    BUN 6 (L) 08/26/2021    CR 0.66 08/26/2021    GLC 71 08/26/2021     COAGS: No results found for: PTT, INR, FIBR  POC: No results found for: BGM, HCG, HCGS  HEPATIC:   Lab Results   Component Value Date    ALT 41 08/26/2021    AST 25 08/26/2021     OTHER:   Lab Results   Component Value Date    [place]ISON 8.5 08/26/2021       Anesthesia Plan    ASA Status:  2      Anesthesia Type: Epidural.              Consents    Anesthesia Plan(s) and associated risks, benefits, and realistic alternatives discussed. Questions answered and patient/representative(s) expressed understanding.     - Discussed with:  Patient         Postoperative Care            Comments:           neg OB ROS.       Ramandeep Lovell MD

## 2021-08-27 NOTE — PLAN OF CARE
Patient used many position changes, the shower, and nitrous oxide to help cope with early labor. Patient then requested and received an epidural. She has been comfortable since receiving an epidural. Remains afebrile and BPs WDL.

## 2021-08-28 LAB — HGB BLD-MCNC: 11.9 G/DL (ref 11.7–15.7)

## 2021-08-28 PROCEDURE — 120N000012 HC R&B POSTPARTUM

## 2021-08-28 PROCEDURE — 85018 HEMOGLOBIN: CPT | Performed by: OBSTETRICS & GYNECOLOGY

## 2021-08-28 PROCEDURE — 36415 COLL VENOUS BLD VENIPUNCTURE: CPT | Performed by: OBSTETRICS & GYNECOLOGY

## 2021-08-28 PROCEDURE — 250N000013 HC RX MED GY IP 250 OP 250 PS 637: Performed by: OBSTETRICS & GYNECOLOGY

## 2021-08-28 RX ADMIN — IBUPROFEN 800 MG: 400 TABLET ORAL at 02:51

## 2021-08-28 RX ADMIN — LABETALOL HYDROCHLORIDE 100 MG: 100 TABLET, FILM COATED ORAL at 09:01

## 2021-08-28 RX ADMIN — ACETAMINOPHEN 650 MG: 325 TABLET, FILM COATED ORAL at 02:51

## 2021-08-28 RX ADMIN — ACETAMINOPHEN 650 MG: 325 TABLET, FILM COATED ORAL at 15:41

## 2021-08-28 RX ADMIN — DOCUSATE SODIUM 100 MG: 100 CAPSULE, LIQUID FILLED ORAL at 09:00

## 2021-08-28 RX ADMIN — ACETAMINOPHEN 650 MG: 325 TABLET, FILM COATED ORAL at 09:00

## 2021-08-28 RX ADMIN — IBUPROFEN 800 MG: 400 TABLET ORAL at 22:06

## 2021-08-28 RX ADMIN — IBUPROFEN 800 MG: 400 TABLET ORAL at 09:00

## 2021-08-28 RX ADMIN — ACETAMINOPHEN 650 MG: 325 TABLET, FILM COATED ORAL at 22:05

## 2021-08-28 RX ADMIN — IBUPROFEN 800 MG: 400 TABLET ORAL at 15:42

## 2021-08-28 RX ADMIN — LABETALOL HYDROCHLORIDE 100 MG: 100 TABLET, FILM COATED ORAL at 20:11

## 2021-08-28 NOTE — ANESTHESIA POSTPROCEDURE EVALUATION
Patient: Valerie Holder    * No procedures listed *    Diagnosis:* No pre-op diagnosis entered *  Diagnosis Additional Information: No value filed.    Anesthesia Type:  No value filed.    Note:  Disposition: Inpatient   Postop Pain Control: Uneventful            Sign Out: Well controlled pain   PONV: No   Neuro/Psych: Uneventful            Sign Out: Acceptable/Baseline neuro status   Airway/Respiratory: Uneventful            Sign Out: Acceptable/Baseline resp. status   CV/Hemodynamics: Uneventful            Sign Out: Acceptable CV status; No obvious hypovolemia; No obvious fluid overload   Other NRE: NONE   DID A NON-ROUTINE EVENT OCCUR? No           Last vitals:  Vitals Value Taken Time   BP     Temp     Pulse     Resp     SpO2         Electronically Signed By: Mark Olsen MD  August 28, 2021  5:04 PM

## 2021-08-28 NOTE — LACTATION NOTE
Routine Lactation visit with Valerie, significant other Geena & baby boy Jj. Valerie reports feeding is going well so far, working on getting a deep latch with each feed. Discussed typical early feeding patterns in first 24 hours, encouraged continuing to work on feeds at least every 2-3 hours. At time of visit, cecy Gardner due to try to feed. Valerie brought him to left breast with LC assist in cross cradle hold. He took a few tries to latch but was able to get a deep latch with LC assisting to sandwich breast. Tried both cross cradle and practiced football hold on left breast. Able to get a deeper, more comfortable latch in football. Several audible swallows heard during feeding. She's using her own nipple cream after each feeding, encouraged continued use.    Reviewed milk supply and engorgement. General questions answered regarding when to expect mature milk, pumping, when to introduce a bottle. Breastfeeding section reviewed in Your Guide to Postpartum [facility] [place] [facility]. Discussed typical  feeding patterns, cluster feeding, and ways to wake a sleepy baby for feedings.    Feeding plan: Recommend unlimited, frequent breast feedings: At least 8 - 12 times every 24 hours. Avoid pacifiers and supplementation with formula unless medically indicated. Encouraged use of feeding log and to record feedings, and void/stool patterns. Valerie has a pump for home use.  Encouraged to call with needs, will revisit as needed. Valerie & Geena appreciative of visit.    Radha Romero, RN-C, IBCLC, MNN, PHN, BSN

## 2021-08-28 NOTE — PLAN OF CARE
Pt's VSS, up ambulating independently, voiding adequately, fundus firm, scant lochia noted. Pain managed with ibuprofen and tylenol. IV SL in place, Hgb ordered for AM draw. Pt breastfeeding infant well with minimal assistance. S.O. supportive to patient and infant at bedside.

## 2021-08-28 NOTE — PLAN OF CARE
Report received from Barb Michel RN and post delivery care of the patient assumed. Placenta delivered at 1901. Initial fundal check WNL.     Dr. Parker called back to the room to evaluate a steady trickle of vaginal bleeding. Fundus is firm and down two from the umbilicus and no increased bleeding with fundal massage.     Report given to Becca Burton RN to assume care.

## 2021-08-28 NOTE — PROGRESS NOTES
Post-partum Note      S: Patient is doing well today.  Pain is controlled with PO medications.  Tolerating regular diet without nausea or vomiting.  Ambulating without dizziness.  Urinating without difficulty. Lochia normal.  Breastfeeding.    O:   Patient Vitals for the past 24 hrs:   BP Temp Temp src Pulse Resp SpO2   08/28/21 0805 129/80 97.6  F (36.4  C) Oral 62 16 --   08/28/21 0210 128/81 98.3  F (36.8  C) Axillary 64 16 --   08/27/21 2145 132/73 98.5  F (36.9  C) Oral 69 16 --   08/27/21 2050 139/76 -- -- -- -- 97 %   08/27/21 2035 137/69 -- -- -- -- 97 %   08/27/21 2020 132/81 -- -- -- -- 97 %   08/27/21 2005 132/75 99.6  F (37.6  C) Temporal -- 16 97 %   08/27/21 2000 132/75 -- -- -- -- 97 %   08/27/21 1935 138/70 -- -- -- -- 97 %   08/27/21 1920 137/63 -- -- -- -- 97 %   08/27/21 1900 (!) 142/67 -- -- -- -- 96 %   08/27/21 1830 -- -- -- -- -- 98 %   08/27/21 1815 -- 97.4  F (36.3  C) Temporal -- 16 98 %   08/27/21 1800 (!) 140/83 -- -- -- -- 94 %   08/27/21 1745 -- -- -- -- -- 95 %   08/27/21 1730 (!) 150/68 -- -- -- 16 96 %   08/27/21 1700 (!) 141/86 98.7  F (37.1  C) Temporal -- 16 96 %   08/27/21 1630 (!) 141/95 -- -- -- -- 94 %   08/27/21 1600 132/87 -- -- -- -- 96 %   08/27/21 1530 129/72 -- -- -- 16 96 %   08/27/21 1500 130/77 98.2  F (36.8  C) Temporal -- 16 96 %   08/27/21 1430 139/63 -- -- -- -- 95 %   08/27/21 1415 -- 98  F (36.7  C) Temporal -- -- --   08/27/21 1400 126/67 -- -- -- -- 95 %   08/27/21 1332 124/75 -- -- -- -- 95 %   08/27/21 1330 -- -- -- -- -- 94 %   08/27/21 1305 125/81 -- -- -- -- 94 %   08/27/21 1300 -- 98.4  F (36.9  C) Temporal -- -- 96 %   08/27/21 1230 123/73 -- -- -- 16 96 %   08/27/21 1200 114/69 98.8  F (37.1  C) Temporal -- 16 96 %   08/27/21 1130 131/80 -- -- -- -- 96 %   08/27/21 1100 129/80 -- -- -- -- 95 %   08/27/21 1055 -- 99.6  F (37.6  C) Temporal -- -- --   08/27/21 1030 109/58 -- -- -- -- 96 %   08/27/21 1003 118/63 -- -- -- -- 95 %   08/27/21 1000 -- 98.5  F  (36.9  C) Temporal -- 16 95 %   21 0930 128/73 -- -- -- -- 96 %   21 0900 131/81 98.8  F (37.1  C) Temporal -- -- 95 %     Gen:  Resting comfortably, NAD  Pulm:  Breathing comfortably on room air  Abd:  Soft, appropriately ttp, non-distended.Fundus below umbilicus, firm and non-tender.  Ext:  non-tender, no bilateral LE edema    I/O last 3 completed shifts:  In: -   Out: 3559 [Urine:3300; Blood:259]    Hgb:   Hemoglobin   Date Value Ref Range Status   2021 11.9 11.7 - 15.7 g/dL Final       Assessment/Plan:  31 year old  on PPD #1 s/p  after IOL for CHTN.  1. Continue with routine postpartum management  2. CHTN: continue labetalol 100mg BID. Monitor BP closely.   3. No signs or symptoms of acute blood loss anemia.  4. Rh: Positive  5. Feed: Breastfeeding  Dispo: MA home PPD#2. Warning signs reviewed. Follow-up in 1 week for BP check. Patient will continue checking BP once daily at home as well and notify us for elevated or low BPs    Stacy Ocasio MD  [facility]  2021, 8:56 AM

## 2021-08-28 NOTE — PLAN OF CARE
Pt with epidural throughout day which has been effective for pain control.  Pt complete at 1500 and began pushing with contractions at 1610.  Pt had decel to 80's when pushing began, Dr Chicas called and asked to come assess pt.  Dr Chicas remained at bedside throughout pushing.  Pt pushed effectively on her left side throughout pushing and delivered a viable male at 1840.

## 2021-08-28 NOTE — PLAN OF CARE
VSS, breastfeeding, fundus is firm at U/1, scant flow, no clots.  Voided x 1.  Independent with cares.  SO at bedside and supportive.  Will continue to monitor and support.

## 2021-08-28 NOTE — PLAN OF CARE
VSS, breastfeeding, fundus is firm at U/2, scant flow, no clots.  Pain controlled with Tylenol and Ibuprofen.  Independent with cares.  SO at bedside and supportive.  Will continue to monitor and support.

## 2021-08-28 NOTE — L&D DELIVERY NOTE
OB Delivery Summary      HISTORY OF PRESENT ILLNESS:   with  IUP @ 39w0d who presented for IOL at 38w5d for cHTN.  Her prenatal course was  complicated by chronic HTN (on labetalol 100mg BID), IVF pregnancy, and elevated 1hr but normal 3hr GCT.  Prenatal labs were significant for blood type A+, rubella status immune.  Glucose challenge test 151 (normal 3hr).  Group beta strep culture was negative.  Estimated fetal weight on admission was approximately 7lb.        FIRST STAGE OF LABOR:  The patient was admitted to Labor and Delivery with a fetal heart rate tracing that was category I. Her cervix was closed and she received oral cytotec for cervical ripening x 12 doses, then Pitocin for induction. SROM occurred at 2:55pm on 21 and she received an epidural for pain control at her request and gradually progressed in labor through the night. Pitocin was gradually increased and she became completely dilated at 3pm on 21.       SECOND STAGE OF LABOR:  The patient began pushing at 4:10pm.  She gradually brought the vertex down in the birth canal and delivered a viable male infant at 6:40pm on 21. There were variable decelerations with every contraction during pushing and she pushed on her side. After delivery of the head, the anterior shoulder and body delivered without difficulty.  No nuchal cord was noted. The infant was placed on the mother's chest.  Delayed cord clamping was performed.        THIRD STAGE OF LABOR:  The cord was clamped and cut. After 20min, the placenta did not spontaneously deliver with gentle traction. Verbal consent was obtained for manual extraction. The placenta then delivered after manual extraction and appeared intact with a 3-vessel cord at 7:01pm.  Pitocin was started and fundal massage was performed.  Perineum was inspected and a first degree laceration was noted. It was repaired with a running suture of 3-0 Vicryl. A slow trickle of blood was noted despite fundal massage,  and a speculum was used to examine the vaginal tract and cervix. No additional lacerations were noted. A dose of IV tranexamic acid was given. Quantitative blood loss for the delivery was 243cc.       Both mother and baby tolerated delivery well and there were no complications. Fetal weight was 1fc32dv and Apgars were 8 and 9 at 1 and 5 minutes, respectively.       Joi Parker MD  8/27/2021  7:13 PM

## 2021-08-28 NOTE — PLAN OF CARE
Data: Valerie Holder transferred to [facility] via wheelchair at 2140. Baby transferred via parent's arms.  Action: Receiving unit notified of transfer: Yes. Patient and family notified of room change. Report given to Merlene Kincaid RN at 2140. Belongings sent to receiving unit. Accompanied by Registered Nurse. Oriented patient to surroundings. Call light within reach. ID bands double-checked with receiving RN.  Response: Patient tolerated transfer and is stable.

## 2021-08-29 VITALS
OXYGEN SATURATION: 97 % | RESPIRATION RATE: 16 BRPM | BODY MASS INDEX: 29.62 KG/M2 | WEIGHT: 200 LBS | HEIGHT: 69 IN | HEART RATE: 66 BPM | DIASTOLIC BLOOD PRESSURE: 91 MMHG | TEMPERATURE: 97.7 F | SYSTOLIC BLOOD PRESSURE: 142 MMHG

## 2021-08-29 PROCEDURE — 250N000013 HC RX MED GY IP 250 OP 250 PS 637: Performed by: OBSTETRICS & GYNECOLOGY

## 2021-08-29 RX ADMIN — LABETALOL HYDROCHLORIDE 100 MG: 100 TABLET, FILM COATED ORAL at 08:17

## 2021-08-29 RX ADMIN — IBUPROFEN 800 MG: 400 TABLET ORAL at 04:03

## 2021-08-29 RX ADMIN — ACETAMINOPHEN 650 MG: 325 TABLET, FILM COATED ORAL at 04:03

## 2021-08-29 RX ADMIN — IBUPROFEN 800 MG: 400 TABLET ORAL at 10:12

## 2021-08-29 RX ADMIN — ACETAMINOPHEN 650 MG: 325 TABLET, FILM COATED ORAL at 10:12

## 2021-08-29 RX ADMIN — DOCUSATE SODIUM 100 MG: 100 CAPSULE, LIQUID FILLED ORAL at 08:17

## 2021-08-29 NOTE — PLAN OF CARE
Pt's VSS, up ambulating independently, voiding adequately, fundus firm, scant lochia. Pain managed with ibuprofen and tylenol. Pt breastfeeding infant well, spouse at bedside, supportive to both patient and infant.

## 2021-08-29 NOTE — PLAN OF CARE
VSS and fundus firm.  Patient states pain adequately managed with PRN pain medications.  Patient discharged to home. Discharge instructions, self care, and reasons to call doctor reviewed with patient.  Rx reviewed, verified and given to patient.  Patient verbalizes understanding to make follow-up appointment as directed by physician.  Patient states no questions with discharge.  Hug and Kisses tags removed.

## 2021-08-29 NOTE — PROGRESS NOTES
Post-partum Note      S: Patient is doing well today.  Pain is controlled with PO medications.  Tolerating regular diet without nausea or vomiting.  Ambulating without dizziness.  Urinating without difficulty. Lochia normal.  Breastfeeding.    O:   Patient Vitals for the past 24 hrs:   BP Temp Temp src Pulse Resp   21 0812 (!) 142/91 97.7  F (36.5  C) Oral 66 16   21 2305 132/83 97.6  F (36.4  C) Axillary 62 16   21 123/71 -- -- 62 --   21 1545 125/72 97.7  F (36.5  C) Oral 62 14     Gen:  Resting comfortably, NAD  Pulm:  Breathing comfortably on room air  Abd:  Soft, appropriately ttp, non-distended.Fundus below umbilicus, firm and non-tender.  :  Healing well, first degree laceration healing well  Ext:  non-tender, no bilateral LE edema    Hgb:   Hemoglobin   Date Value Ref Range Status   2021 11.9 11.7 - 15.7 g/dL Final       Assessment/Plan:  31 year old  on PPD #2 s/p  after IOL for CHTN.  1. Continue with routine postpartum management  2. CHTN: continue labetalol 100mg BID. Monitor BP closely.   3. No signs or symptoms of acute blood loss anemia.  4. Rh: Positive  5. Feed: Breastfeeding  Dispo: [place] home PPD#2. Warning signs reviewed. Follow-up in 1 week for BP check. Patient will continue checking BP once daily at home as well and notify us for elevated or low BPs    MD Naseem [facility]  2021 8:31 AM

## 2021-09-02 ENCOUNTER — HOSPITAL ENCOUNTER (OUTPATIENT)
Facility: CLINIC | Age: 31
End: 2021-09-02
Attending: OBSTETRICS & GYNECOLOGY | Admitting: OBSTETRICS & GYNECOLOGY
Payer: COMMERCIAL

## 2021-09-02 ENCOUNTER — HOSPITAL ENCOUNTER (INPATIENT)
Facility: CLINIC | Age: 31
LOS: 2 days | Discharge: HOME | DRG: 776 | End: 2021-09-04
Attending: OBSTETRICS & GYNECOLOGY | Admitting: OBSTETRICS & GYNECOLOGY
Payer: COMMERCIAL

## 2021-09-02 LAB
ABO/RH(D): NORMAL
ALT SERPL W P-5'-P-CCNC: 52 U/L (ref 0–50)
ANTIBODY SCREEN: NEGATIVE
AST SERPL W P-5'-P-CCNC: 23 U/L (ref 0–45)
CREAT SERPL-MCNC: 0.51 MG/DL (ref 0.52–1.04)
ERYTHROCYTE [DISTWIDTH] IN BLOOD BY AUTOMATED COUNT: 12.7 % (ref 10–15)
GFR SERPL CREATININE-BSD FRML MDRD: >90 ML/MIN/1.73M2
HCT VFR BLD AUTO: 36.3 % (ref 35–47)
HGB BLD-MCNC: 12.1 G/DL (ref 11.7–15.7)
MCH RBC QN AUTO: 30 PG (ref 26.5–33)
MCHC RBC AUTO-ENTMCNC: 33.3 G/DL (ref 31.5–36.5)
MCV RBC AUTO: 90 FL (ref 78–100)
PLATELET # BLD AUTO: 270 10E3/UL (ref 150–450)
RBC # BLD AUTO: 4.03 10E6/UL (ref 3.8–5.2)
SPECIMEN EXPIRATION DATE: NORMAL
WBC # BLD AUTO: 8.8 10E3/UL (ref 4–11)

## 2021-09-02 PROCEDURE — 258N000003 HC RX IP 258 OP 636: Performed by: OBSTETRICS & GYNECOLOGY

## 2021-09-02 PROCEDURE — 82565 ASSAY OF CREATININE: CPT | Performed by: OBSTETRICS & GYNECOLOGY

## 2021-09-02 PROCEDURE — 36415 COLL VENOUS BLD VENIPUNCTURE: CPT | Performed by: OBSTETRICS & GYNECOLOGY

## 2021-09-02 PROCEDURE — 86900 BLOOD TYPING SEROLOGIC ABO: CPT | Performed by: OBSTETRICS & GYNECOLOGY

## 2021-09-02 PROCEDURE — 250N000011 HC RX IP 250 OP 636

## 2021-09-02 PROCEDURE — 250N000011 HC RX IP 250 OP 636: Performed by: OBSTETRICS & GYNECOLOGY

## 2021-09-02 PROCEDURE — 84460 ALANINE AMINO (ALT) (SGPT): CPT | Performed by: OBSTETRICS & GYNECOLOGY

## 2021-09-02 PROCEDURE — 84450 TRANSFERASE (AST) (SGOT): CPT | Performed by: OBSTETRICS & GYNECOLOGY

## 2021-09-02 PROCEDURE — 250N000013 HC RX MED GY IP 250 OP 250 PS 637: Performed by: OBSTETRICS & GYNECOLOGY

## 2021-09-02 PROCEDURE — 85027 COMPLETE CBC AUTOMATED: CPT | Performed by: OBSTETRICS & GYNECOLOGY

## 2021-09-02 PROCEDURE — 120N000012 HC R&B POSTPARTUM

## 2021-09-02 RX ORDER — CALCIUM GLUCONATE 94 MG/ML
1 INJECTION, SOLUTION INTRAVENOUS
Status: DISCONTINUED | OUTPATIENT
Start: 2021-09-02 | End: 2021-09-04 | Stop reason: HOSPADM

## 2021-09-02 RX ORDER — MAGNESIUM SULFATE HEPTAHYDRATE 40 MG/ML
4 INJECTION, SOLUTION INTRAVENOUS ONCE
Status: COMPLETED | OUTPATIENT
Start: 2021-09-02 | End: 2021-09-02

## 2021-09-02 RX ORDER — IBUPROFEN 400 MG/1
400 TABLET, FILM COATED ORAL EVERY 6 HOURS PRN
COMMUNITY

## 2021-09-02 RX ORDER — MAGNESIUM SULFATE HEPTAHYDRATE 40 MG/ML
4 INJECTION, SOLUTION INTRAVENOUS
Status: DISCONTINUED | OUTPATIENT
Start: 2021-09-02 | End: 2021-09-04 | Stop reason: HOSPADM

## 2021-09-02 RX ORDER — MAGNESIUM SULFATE HEPTAHYDRATE 40 MG/ML
2 INJECTION, SOLUTION INTRAVENOUS
Status: DISCONTINUED | OUTPATIENT
Start: 2021-09-02 | End: 2021-09-04 | Stop reason: HOSPADM

## 2021-09-02 RX ORDER — SODIUM CHLORIDE, SODIUM LACTATE, POTASSIUM CHLORIDE, CALCIUM CHLORIDE 600; 310; 30; 20 MG/100ML; MG/100ML; MG/100ML; MG/100ML
10-125 INJECTION, SOLUTION INTRAVENOUS CONTINUOUS
Status: DISCONTINUED | OUTPATIENT
Start: 2021-09-02 | End: 2021-09-04 | Stop reason: HOSPADM

## 2021-09-02 RX ORDER — PRENATAL VIT/IRON FUM/FOLIC AC 27MG-0.8MG
1 TABLET ORAL DAILY
COMMUNITY

## 2021-09-02 RX ORDER — LABETALOL HYDROCHLORIDE 5 MG/ML
20 INJECTION, SOLUTION INTRAVENOUS ONCE
Status: DISCONTINUED | OUTPATIENT
Start: 2021-09-02 | End: 2021-09-04 | Stop reason: HOSPADM

## 2021-09-02 RX ORDER — ACETAMINOPHEN 325 MG/1
650 TABLET ORAL EVERY 4 HOURS PRN
Status: DISCONTINUED | OUTPATIENT
Start: 2021-09-02 | End: 2021-09-04 | Stop reason: HOSPADM

## 2021-09-02 RX ORDER — LABETALOL 100 MG/1
100 TABLET, FILM COATED ORAL EVERY 12 HOURS SCHEDULED
Status: DISCONTINUED | OUTPATIENT
Start: 2021-09-02 | End: 2021-09-03

## 2021-09-02 RX ORDER — ACETAMINOPHEN 500 MG
500-1000 TABLET ORAL EVERY 6 HOURS PRN
COMMUNITY

## 2021-09-02 RX ORDER — IBUPROFEN 400 MG/1
400-800 TABLET, FILM COATED ORAL EVERY 6 HOURS PRN
Status: DISCONTINUED | OUTPATIENT
Start: 2021-09-02 | End: 2021-09-04 | Stop reason: HOSPADM

## 2021-09-02 RX ORDER — MAGNESIUM SULFATE HEPTAHYDRATE 500 MG/ML
10 INJECTION, SOLUTION INTRAMUSCULAR; INTRAVENOUS
Status: DISCONTINUED | OUTPATIENT
Start: 2021-09-02 | End: 2021-09-04 | Stop reason: HOSPADM

## 2021-09-02 RX ORDER — LORAZEPAM 2 MG/ML
2 INJECTION INTRAMUSCULAR
Status: DISCONTINUED | OUTPATIENT
Start: 2021-09-02 | End: 2021-09-04 | Stop reason: HOSPADM

## 2021-09-02 RX ORDER — MAGNESIUM SULFATE IN WATER 40 MG/ML
2 INJECTION, SOLUTION INTRAVENOUS CONTINUOUS
Status: DISCONTINUED | OUTPATIENT
Start: 2021-09-02 | End: 2021-09-03

## 2021-09-02 RX ORDER — DOCUSATE SODIUM 100 MG/1
100 CAPSULE, LIQUID FILLED ORAL 2 TIMES DAILY
COMMUNITY

## 2021-09-02 RX ORDER — NIFEDIPINE 10 MG/1
10-20 CAPSULE ORAL
Status: DISCONTINUED | OUTPATIENT
Start: 2021-09-02 | End: 2021-09-04 | Stop reason: HOSPADM

## 2021-09-02 RX ORDER — MAGNESIUM SULFATE HEPTAHYDRATE 40 MG/ML
INJECTION, SOLUTION INTRAVENOUS
Status: COMPLETED
Start: 2021-09-02 | End: 2021-09-02

## 2021-09-02 RX ADMIN — LABETALOL HYDROCHLORIDE 100 MG: 100 TABLET, FILM COATED ORAL at 19:40

## 2021-09-02 RX ADMIN — MAGNESIUM SULFATE IN WATER 2 G/HR: 40 INJECTION, SOLUTION INTRAVENOUS at 11:22

## 2021-09-02 RX ADMIN — MAGNESIUM SULFATE IN WATER 2 G/HR: 40 INJECTION, SOLUTION INTRAVENOUS at 21:24

## 2021-09-02 RX ADMIN — SODIUM CHLORIDE, POTASSIUM CHLORIDE, SODIUM LACTATE AND CALCIUM CHLORIDE 125 ML/HR: 600; 310; 30; 20 INJECTION, SOLUTION INTRAVENOUS at 10:47

## 2021-09-02 RX ADMIN — ACETAMINOPHEN 650 MG: 325 TABLET, FILM COATED ORAL at 18:39

## 2021-09-02 RX ADMIN — ACETAMINOPHEN 650 MG: 325 TABLET, FILM COATED ORAL at 23:19

## 2021-09-02 RX ADMIN — MAGNESIUM SULFATE HEPTAHYDRATE 4 G: 40 INJECTION, SOLUTION INTRAVENOUS at 10:48

## 2021-09-02 RX ADMIN — ACETAMINOPHEN 650 MG: 325 TABLET, FILM COATED ORAL at 14:29

## 2021-09-02 RX ADMIN — SODIUM CHLORIDE, POTASSIUM CHLORIDE, SODIUM LACTATE AND CALCIUM CHLORIDE 75 ML/HR: 600; 310; 30; 20 INJECTION, SOLUTION INTRAVENOUS at 23:12

## 2021-09-02 NOTE — PLAN OF CARE
"Patient sent over from clinic after spot checking her blood pressures last evening/night with values creeping up.  Pt called on-call MD, Dr. Chicas.  Dr. Susan Chung saw her in the clinic this morning and sent her to hospital for direct admit for preeclampsia eval and magnesium infusion.    Pt is here with her wife, Geena, and their son, Jj. Pt complaining of mild headache and some right upper quadrant pain \"3\".  Labs have been collected.  Initial /91.  BP at home 164/100, BP in clinic 170/100.  Reflexes brisk (+3), no clonus.      Magnisium Sulfate 4 gram load started in MAC.  "

## 2021-09-02 NOTE — PLAN OF CARE
Pt is 6 days post partum.  She had a first degree vaginal lac.  She reports feeling well.  Breastfeeding her son jJ is going well.

## 2021-09-02 NOTE — PLAN OF CARE
Transferred to room 416 from MAC 1 via wheelchair at 1140.  Wife Merit and Max present.  Report given to Jill Parikh RN, at 1140.

## 2021-09-02 NOTE — PLAN OF CARE
Pt arrived on floor at 1140 with baby, partner and personal belongings. Magnesium sulfate infusing at 2gm/hr, LR at 75ml/hr. /93, c/o mild HA, epigastric pain, denies vision changes. Mild swelling. Reflexes 3+, no clonus. SBA to BR, voiding well. Dr. Chung updated on pt. labs and plan is to stay on Labetalol 100 mg BID, VS every 4 hrs. and call if /100 or greater. Pt. had her Labetalol dose this am. Tylenol for pain rating of 3/10. Breastfeeding her baby boy who is rooming in. Rooming in agreement signed.

## 2021-09-02 NOTE — H&P
OB Brief Admit H&P    Pt is a 31 year old  PPD#6 s/p  who presented to clinic complaining of mild headache and elevated blood pressures. Of note pregnancy has been complicated by chronic hypertension on labetalol 100 BID.  She called in last night complaining of headache and /100. Took labetalol early in AM and BPs at home improved to 140/90s. On presentation to clinic today however BPs 170/100 x2 with continued mild headache.  Additionally patient reports mild RUQ pain that can wrap around to her back. Denies any vision changes, chest pain, shortness of breath. Patient recommended to proceed to the hospital for admission and IV magnesium.    Labor and delivery course was notable for long cervical ripening course followed by SROM and progression. She delivered vaginally after pushing for 2.5 hours, 7 lb14 oz baby boy. Placenta was manually extracted. 1st degree laceration.  ml. A single does of IV TXA was given.  Postpartum course was notable for mild range blood pressures, her labetalol 100 mg BID was continued during that time. She was discharged home on PPD#2.      Vitals:    21 1120 21 1125 21 1143 21 1400   BP: 138/80 (!) 143/92 (!) 133/93    Pulse:   68    Resp: 16  18    Temp:       TempSrc:       Weight:    84.2 kg (185 lb 9.6 oz)        GEN: NAD  CV: well perfused  Pulm: Non labored breathing    Results for orders placed or performed during the hospital encounter of 21   CBC with platelets     Status: Normal   Result Value Ref Range    WBC Count 8.8 4.0 - 11.0 10e3/uL    RBC Count 4.03 3.80 - 5.20 10e6/uL    Hemoglobin 12.1 11.7 - 15.7 g/dL    Hematocrit 36.3 35.0 - 47.0 %    MCV 90 78 - 100 fL    MCH 30.0 26.5 - 33.0 pg    MCHC 33.3 31.5 - 36.5 g/dL    RDW 12.7 10.0 - 15.0 %    Platelet Count 270 150 - 450 10e3/uL   AST     Status: Normal   Result Value Ref Range    AST 23 0 - 45 U/L   ALT     Status: Abnormal   Result Value Ref Range    ALT 52 (H) 0 -  50 U/L   Creatinine     Status: Abnormal   Result Value Ref Range    Creatinine 0.51 (L) 0.52 - 1.04 mg/dL    GFR Estimate >90 >60 mL/min/1.73m2   Adult Type and Screen     Status: None   Result Value Ref Range    ABO/RH(D) A POS     Antibody Screen Negative Negative    SPECIMEN EXPIRATION DATE [id number]    ABO/Rh type and screen     Status: None    Narrative    The following orders were created for panel order ABO/Rh type and screen.  Procedure                               Abnormality         Status                     ---------                               -----------         ------                     Adult Type and Screen[[id number]]                            Final result                 Please view results for these tests on the individual orders.       Assessment:  31 year old  PPD#6 s/p  who presented to clinic complaining of mild headache and severe range blood pressures concerning for superimposed preeclampsia with severe features.    Superimposed preeclampsia with severe features:  - BPs high mild range once arrived at hospital but given severe range reported at home and x2 in clinic recommended starting IV magnesium for seizure prophylaxis.   - IV antihypertensives for severe range BPs   - Continue labetalol 100 mg BID. Will adjust as needed.  - S/p Mag 4g load, now continuing at 2g/hr  - PreE labs collected: Notable for mildly elevated ALT, other labs WNL. Repeat in AM (ordered)  - Mild HA - tylenol/ibuprofen PRN pain  - strict I/Os, daily weights    Postpartum:  - tylenol, ibuprofen PRN  - Baby boy in room, breastfeeding    Susan Chung MD  2021  2:34 PM

## 2021-09-03 LAB
ALT SERPL W P-5'-P-CCNC: 44 U/L (ref 0–50)
AST SERPL W P-5'-P-CCNC: 17 U/L (ref 0–45)
CREAT SERPL-MCNC: 0.58 MG/DL (ref 0.52–1.04)
GFR SERPL CREATININE-BSD FRML MDRD: >90 ML/MIN/1.73M2
HGB BLD-MCNC: 13.2 G/DL (ref 11.7–15.7)
PLATELET # BLD AUTO: 302 10E3/UL (ref 150–450)

## 2021-09-03 PROCEDURE — 120N000012 HC R&B POSTPARTUM

## 2021-09-03 PROCEDURE — 250N000011 HC RX IP 250 OP 636: Performed by: OBSTETRICS & GYNECOLOGY

## 2021-09-03 PROCEDURE — 36415 COLL VENOUS BLD VENIPUNCTURE: CPT | Performed by: OBSTETRICS & GYNECOLOGY

## 2021-09-03 PROCEDURE — 85049 AUTOMATED PLATELET COUNT: CPT | Performed by: OBSTETRICS & GYNECOLOGY

## 2021-09-03 PROCEDURE — 85018 HEMOGLOBIN: CPT | Performed by: OBSTETRICS & GYNECOLOGY

## 2021-09-03 PROCEDURE — 250N000013 HC RX MED GY IP 250 OP 250 PS 637: Performed by: OBSTETRICS & GYNECOLOGY

## 2021-09-03 PROCEDURE — 82565 ASSAY OF CREATININE: CPT | Performed by: OBSTETRICS & GYNECOLOGY

## 2021-09-03 PROCEDURE — 84460 ALANINE AMINO (ALT) (SGPT): CPT | Performed by: OBSTETRICS & GYNECOLOGY

## 2021-09-03 PROCEDURE — 84450 TRANSFERASE (AST) (SGOT): CPT | Performed by: OBSTETRICS & GYNECOLOGY

## 2021-09-03 RX ORDER — LABETALOL 200 MG/1
200 TABLET, FILM COATED ORAL EVERY 8 HOURS SCHEDULED
Status: DISCONTINUED | OUTPATIENT
Start: 2021-09-03 | End: 2021-09-04 | Stop reason: HOSPADM

## 2021-09-03 RX ORDER — FUROSEMIDE 10 MG/ML
10 INJECTION INTRAMUSCULAR; INTRAVENOUS ONCE
Status: COMPLETED | OUTPATIENT
Start: 2021-09-03 | End: 2021-09-04

## 2021-09-03 RX ORDER — LABETALOL 200 MG/1
200 TABLET, FILM COATED ORAL EVERY 12 HOURS SCHEDULED
Status: DISCONTINUED | OUTPATIENT
Start: 2021-09-03 | End: 2021-09-03

## 2021-09-03 RX ORDER — LABETALOL 200 MG/1
200 TABLET, FILM COATED ORAL ONCE
Status: COMPLETED | OUTPATIENT
Start: 2021-09-03 | End: 2021-09-03

## 2021-09-03 RX ADMIN — LABETALOL HYDROCHLORIDE 200 MG: 200 TABLET, FILM COATED ORAL at 17:25

## 2021-09-03 RX ADMIN — IBUPROFEN 800 MG: 400 TABLET, FILM COATED ORAL at 19:15

## 2021-09-03 RX ADMIN — LABETALOL HYDROCHLORIDE 200 MG: 200 TABLET, FILM COATED ORAL at 08:10

## 2021-09-03 RX ADMIN — MAGNESIUM SULFATE IN WATER 2 G/HR: 40 INJECTION, SOLUTION INTRAVENOUS at 06:42

## 2021-09-03 RX ADMIN — IBUPROFEN 800 MG: 400 TABLET, FILM COATED ORAL at 08:11

## 2021-09-03 NOTE — PLAN OF CARE
Pt's VSS, monitoring blood pressures Q4hrs. Blood pressures running 140s/100s. MD notified, labetalol increased to 200mg BID. 3+ reflexes, no clonus present. Mg running at 2g/hr w/ LR at 75ml/hr. Pt c/o headache, no other symptoms per pt. Pt adequately voiding, monitoring intake and output. Spouse and infant rooming in.

## 2021-09-03 NOTE — LACTATION NOTE
"Lactation visit with Valerie, grandma and baby Jj. Valerie was readmitted for high BP. Routine check-in visit to make sure everything was going smoothly with breastfeeding.  Valerie was breastfeeding infant at time of visit. Valerie's milk is in and she shares breastfeeding has been going well. Valerie positioning infant in cross cradle, infant bopping his head around quite a bit and getting frustrated. Offered tips for guiding infant to latch. Because the breast is firm with milk, cecy Gardner is learning to breastfeed on a \"different\" surface. He may continue to need direction/help with his latch. Observed infant breastfeeding on both breasts, great breastfeed!    Valerie has survived engorgement. We reviewed to feed infant on demand, and at least 8 feedings in 24 hours. Continue to track feedings/ wet and dirty diapers. Discussed when to begin pumping and offering a bottle. Valerie is excited for her wife to be able to feed infant as well.    Suggested \"Guide to Postpartum and  Care\" handbook is a great resource going forward for topics that include engorgement, plugged milk ducts, mastitis, safe sleep, and safety of baby.     Feeding plan recommendations: provide unlimited, on-demand breast feedings: At least 8-12 times/24 hours (reviewed early feeding cues). Encouraged on-going use of a feeding log or palmira to record feedings along with void/stool patterns. Follow up with Pediatrician as requested and encouraged lactation follow up. Reviewed [facility] outpatient lactation resources. Appreciative of visit.    Vicenta Pope RN, IBCLC            "

## 2021-09-03 NOTE — PROVIDER NOTIFICATION
Dr. Ocasio returned page to Naseem Osborn regarding pt status. Pt's blood pressures trending around 140s/100s. Pt symptomatic with a headache. Verbal telephone order to increase Labetalol order from 100mg to 200mg BID. Will continue to monitor pt's blood pressures Q4hrs per orders.

## 2021-09-03 NOTE — PROGRESS NOTES
OBGYN Note      S: Patient is doing well today. Denies pain, headache has resolved. Denies vision changes, RUQ pain, CP, SOB. Tolerating regular diet without nausea or vomiting.  Ambulating without dizziness.  Urinating without difficulty. Lochia normal.  Breastfeeding, baby Max rooming in.    O:   Patient Vitals for the past 24 hrs:   BP Temp Temp src Pulse Resp Weight   21 1146 (!) 136/93 98.1  F (36.7  C) Oral 65 18 --   21 0835 (!) 135/93 -- -- 78 18 --   21 0811 (!) 158/105 -- -- 69 18 --   21 0634 (!) 149/96 -- -- 68 16 83.1 kg (183 lb 4.8 oz)   21 0316 (!) 142/100 -- -- 64 16 --   21 2319 (!) 146/100 -- -- 62 16 --   21 2114 (!) 140/94 -- -- 63 -- --   21 1940 (!) 138/91 -- -- 77 -- --   21 1532 (!) 141/85 98.2  F (36.8  C) Tympanic 76 16 --   21 1400 -- -- -- -- -- 84.2 kg (185 lb 9.6 oz)     Gen:  Resting comfortably, NAD  CV:  RRR  Lungs:  CTAB  Abd:  Soft, appropriately ttp, non-distended.Fundus well below umbilicus, firm and non-tender  Ext:  non-tender, no bilateral LE edema    I/O last 3 completed shifts:  In: 8834 [P.O.:6400; I.V.:2434]  Out: 9325 [Urine:9325]   -1241ml since admission    Labs:  pending       Assessment/Plan:  31 year old  HD#2, PPD #7 readmitted with CHTN with superimposed preeclampsia with severe features. Now s/p magnesium sulfate for seizure prophylaxis x24 hours (just discontinued). Labetalol increased this morning with improved control of blood pressures.   1. CHTN with superimposed preeclampsia:   - labetalol increased to 200mg BID. Continue to monitor closely. BP q2h while awake.  - Repeat pre-e labs (ALT elevated on admit).  - Strict I/O  2. Routine postpartum cares.    Dispo: DC home likely tomorrow.     Stacy Ocasio MD  [facility]  9/3/2021, 12:05 PM

## 2021-09-03 NOTE — PLAN OF CARE
Labetalol frequency changed to q 8 hours per MD orders. Patient updated on plan of care. Pt continues to have occasional headache; denies visual disturbances or epigastric pain. Intermittent right flank pain; pt thinks it might be gas. Encouraged to call with worsening symptoms. Continue monitoring.

## 2021-09-03 NOTE — PROGRESS NOTES
Brief Note    BP reviewed. Will give additional dose of labetalol 200mg now, and change to 200 mg labetalol TID dosing.     Stacy Ocasio MD   [facility]  9/3/2021 5:06 PM

## 2021-09-03 NOTE — PLAN OF CARE
Vital signs are stable.  Magnesium sulfate infusing at 2gm/hr, LR at 75ml/hr. /85 at 1535 and 138/91 at 1930.  Denied epigastric pain, denies vision changes. Mild swelling. Reflexes 3+, no clonus.  Patient's only symptom is headache that could be relieved after ambulating, taking tylenol, or resting.  Call if /100 or greater. Pt.is taking labetalol. Breastfeeding her baby boy who is rooming in. Rooming in agreement signed.  Will continue to monitor.

## 2021-09-03 NOTE — PLAN OF CARE
BP this am was 158/105, Labetalol 200mg given, recheck BP was 135/93. Afternoon BP's were 130-140/90's. Ibuprofen given this morning and relieved headache that was a 4/10. Denies vision changes and epigastric pain today. Magnesium discontinued at 1200 per MD order. SL in place. Plan to monitor pts. VS every 2 hrs while she is awake and every 4 hrs at night. Reflexes 2-3+, no clonus. Voiding without difficulty. Labs ordered for this afternoon, will call to  when they result. Baby rooming in with pt. and she is breastfeeding.

## 2021-09-04 ENCOUNTER — HOSPITAL ENCOUNTER (INPATIENT)
Facility: CLINIC | Age: 31
End: 2021-09-04
Admitting: OBSTETRICS & GYNECOLOGY
Payer: COMMERCIAL

## 2021-09-04 VITALS
WEIGHT: 182.1 LBS | SYSTOLIC BLOOD PRESSURE: 150 MMHG | TEMPERATURE: 98.2 F | BODY MASS INDEX: 26.89 KG/M2 | HEART RATE: 64 BPM | OXYGEN SATURATION: 99 % | DIASTOLIC BLOOD PRESSURE: 99 MMHG | RESPIRATION RATE: 18 BRPM

## 2021-09-04 LAB
ALT SERPL W P-5'-P-CCNC: 45 U/L (ref 0–50)
AST SERPL W P-5'-P-CCNC: 14 U/L (ref 0–45)
CREAT SERPL-MCNC: 0.65 MG/DL (ref 0.52–1.04)
GFR SERPL CREATININE-BSD FRML MDRD: >90 ML/MIN/1.73M2
HGB BLD-MCNC: 13.2 G/DL (ref 11.7–15.7)
PLATELET # BLD AUTO: 331 10E3/UL (ref 150–450)

## 2021-09-04 PROCEDURE — 85018 HEMOGLOBIN: CPT | Performed by: OBSTETRICS & GYNECOLOGY

## 2021-09-04 PROCEDURE — 84460 ALANINE AMINO (ALT) (SGPT): CPT | Performed by: OBSTETRICS & GYNECOLOGY

## 2021-09-04 PROCEDURE — 36415 COLL VENOUS BLD VENIPUNCTURE: CPT | Performed by: OBSTETRICS & GYNECOLOGY

## 2021-09-04 PROCEDURE — 250N000013 HC RX MED GY IP 250 OP 250 PS 637: Performed by: OBSTETRICS & GYNECOLOGY

## 2021-09-04 PROCEDURE — 85049 AUTOMATED PLATELET COUNT: CPT | Performed by: OBSTETRICS & GYNECOLOGY

## 2021-09-04 PROCEDURE — 250N000011 HC RX IP 250 OP 636: Performed by: OBSTETRICS & GYNECOLOGY

## 2021-09-04 PROCEDURE — 84450 TRANSFERASE (AST) (SGOT): CPT | Performed by: OBSTETRICS & GYNECOLOGY

## 2021-09-04 PROCEDURE — 82565 ASSAY OF CREATININE: CPT | Performed by: OBSTETRICS & GYNECOLOGY

## 2021-09-04 RX ORDER — LABETALOL 100 MG/1
200 TABLET, FILM COATED ORAL 3 TIMES DAILY
Start: 2021-09-04

## 2021-09-04 RX ADMIN — LABETALOL HYDROCHLORIDE 200 MG: 200 TABLET, FILM COATED ORAL at 08:45

## 2021-09-04 RX ADMIN — LABETALOL HYDROCHLORIDE 200 MG: 200 TABLET, FILM COATED ORAL at 01:11

## 2021-09-04 RX ADMIN — FUROSEMIDE 10 MG: 10 INJECTION, SOLUTION INTRAVENOUS at 01:08

## 2021-09-04 NOTE — PLAN OF CARE
Called to pt bathroom to assess clot/bleeding. [place] sized clot on pad; small pad 3/4 saturated. Fundus barely palpable, U/3. No free flow or clots expressed. Reviewed warning signs and encouraged to call RN if additional clots/heavy bleeding noted.

## 2021-09-04 NOTE — PLAN OF CARE
BP's 140--150's/80-90's, on  200mg Labetalol TID, am dose given. Denies epigastric pain, vision changes. Mild HA, declines pain meds. Reflexes 3+, no clonus. Labs drawn this am and MD reviewed. Will discharge to home and follow up with MD in one week for BP check. Discharge instructions reviewed with pt. and she verbalized understanding. Discharge to home.

## 2021-09-04 NOTE — PROGRESS NOTES
OB Post-partum Note HD#3, PPD# 8    S:  Patient feeling doing well. Rare mild HA /10 resolves quickly. No RUQ pain but right abdominal musculoskeletal pain continues. UOP not noticeably increased with 10 mg IV lasix.  Pain controlled.  Voiding.  Bleeding normal.  Breast feeding.    O:  BP (!) 150/99   Pulse 64   Temp 98.2  F (36.8  C) (Oral)   Resp 18   Wt 82.6 kg (182 lb 1.6 oz)   SpO2 99%   Breastfeeding Yes   BMI 26.89 kg/m     Vitals:    21 0346 21 0610 21 0615 21 0744   BP: (!) 142/88 (!) 145/98  (!) 150/99   Pulse: 65 65  64   Resp: 16 16  18   Temp: 97.6  F (36.4  C) 98.2  F (36.8  C)     TempSrc: Axillary Oral     SpO2: 99%      Weight:   82.6 kg (182 lb 1.6 oz)      I/O since admission 5.8/7.6 (2,750 since MN).   Gen- A&O, NAD  Abd- Non-tender, fundus firm at umbilicus  Ext- non-tender, no edema    Lab Results   Component Value Date    WBC 8.8 2021    HGB 13.2 2021    HCT 36.3 2021    MCV 90 2021     2021     Lab Results   Component Value Date    CR 0.65 2021     Lab Results   Component Value Date    AST 14 2021    ALT 45 2021     A POS  Rubella Immune    A/P: 31 year old  HD#3, PPD #8 readmitted with CHTN with superimposed preeclampsia with severe features. Now s/p magnesium sulfate for seizure prophylaxis x24 hours (discontinued yesterday morning). Labetalol increased yesterday to 200 mg TID with improved control of blood pressures. IV lasix given 10 mg x 1 yesterday evening due to increased maternal weight 1 lb in 24 hours, now with loss of 1 lb and adequate diuresis overnight.    1. CHTN with superimposed preeclampsia:   - labetalol increased to 200mg TID. Better control, mild range.  - Repeat pre-e labs normal (ALT elevated on admit).  - UOP good, slight increase in diuresis after lasix  - No evidence of severe disease or fluid retention.    2. Routine postpartum cares.    3. Discharge home today, bp and  symptoms parameters reviewed. F/u in office in 2-4 days.     Jo-Ann Weiss MD  9/4/2021  9:01 AM

## 2021-09-04 NOTE — PLAN OF CARE
Pt declined pain meds overnight, Bp's 150's/90's, complaint of headache at times, 2-3+ reflexes, no clonus present, denies epigastric pain or vision changes. IV saline locked, pt voiding, lasix given x 1. Ambulating in hallway with steady gait. Will have labs in AM. Encouraged to call with any questions or concerns. Will continue to monitor.

## 2021-09-09 NOTE — DISCHARGE SUMMARY
Service Date: 09/04/2021  Discharge Date: 09/04/2021    DISCHARGE DIAGNOSIS:   Postpartum preeclampsia, superimposed with severe features.    DISCHARGE MEDICATIONS:    1.  Labetalol 200 mg by mouth 3 times a day.  2.  Prenatal vitamin.  3.  Tylenol 500 mg by mouth every 6 hours as needed.  4.  Vitamin D3 1000 International Units by mouth daily.  5.  Colace 100 mg by mouth 2 times daily as needed.  6.  Ibuprofen 400 mg by mouth every 6 hours as needed for moderate pain.    DISCHARGE INSTRUCTIONS:  The patient is to call for blood pressure greater than 160/105 x 2, headache, blurry vision, or right upper quadrant pain.    DISCHARGE FOLLOWUP:  The patient is to follow up in 3-4 days as well as 6 weeks in the office.    BRIEF HOSPITAL COURSE:  Valerie Holder is a very pleasant 31-year-old female who presented to our office on postpartum day #6 after a normal spontaneous vaginal delivery complaining of a mild headache and elevated blood pressures.  The patient's pregnancy had been complicated by chronic hypertension managed with labetalol 100 mg twice daily.  She had undergone induction of labor and spontaneous vaginal delivery at 38 weeks for the above diagnosis.  The day of readmission she had called the prior night with complaints of headache and blood pressure 160/100.  Her blood pressures improved after early dose of labetalol to 140s/90s however, her blood pressure in the office was 170 x 100 x 2 with continued mild headache.  She also had some mild right upper quadrant pain that wraps around to her back.  She denied vision changes, chest pain, shortness of breath.  She was admitted to the hospital directly for IV magnesium due to postpartum preeclampsia.  Admission labs were notable for a platelet count of 270, AST 23, ALT 52 (very mildly elevated).  Creatinine 0.51.  Hospital course:  The patient admitted to postpartum floor for administration of magnesium sulfate and monitoring of postpartum preeclampsia  superimposed with severe features.  Magnesium load was started on IV antihypertensives utilized for any severe range blood pressures.  Her initial intake and output was monitored closely and she had a net diuresis of 1.2 liters over the course of the first 24 hours.  She had her labetalol dose was increased to 200 mg twice daily and repeat preeclampsia labs were notable for complete normalization of ALT, which has been very mildly elevated on admission.  Magnesium sulfate was continued through 24 hours of admission.  Some blood pressure elevation was noted again on hospital day #2, Labetalol dose was again increased to 200 mg 3 times a day.  This improved her blood pressures satisfactorily.  On hospital day #3 she reported a very mild 1/10 headache that comes and goes.  She received a dose of IV Lasix the night before for weight gain of 1 pound over the course of the prior 24 hours.  She had good diuresis as a response.  Day of discharge, the patient was feeling well and had no signs or symptoms of preeclampsia.  She was discharged home with the above-mentioned medications and follow up as instructed.    Jo-Ann Weiss MD        D: 2021   T: 2021   MT: [id number]    Name:     YUNG LOMELITerrence  MRN:      [id number]        Account:      [id number]   :      1990           Service Date: 2021                                  Discharge Date: 2021     Document: [id number]

## 2021-10-11 ENCOUNTER — HEALTH MAINTENANCE LETTER (OUTPATIENT)
Age: 31
End: 2021-10-11

## 2021-10-12 NOTE — PLAN OF CARE
Patient called today with regards to the following prescription request: Current    Provider: Zak Koehler MD  Medication: 1. HYDROcodone-acetaminophen (NORCO)  MG per tablet  2.  morphine SR (MS Contin) 30 MG Tab CR 12 hr tablet    Pharmacy:   [id number] - [place], [place] - [address]  [address] [address]  Phone: [phone] Fax: [phone]      For additional information, or if you need to contact the caller at the following number:    Contact Phone Number:   Mobile [phone]       Patient states that it is okay to leave a detailed message.    Preferred time for callback: any    Patient was instructed to call pharmacy directly for future refills.    Advised caller that refill processing may take 48-72 hours and that the pharmacy will contact them when their prescription is ready for pickup.   Vitals stable. Up ad tricia well. Voiding without difficulty. Pain controlled with tylenol and ibuprofen. Breastfeeding going well. Infant getting spitty this afternoon.

## 2022-09-25 ENCOUNTER — HEALTH MAINTENANCE LETTER (OUTPATIENT)
Age: 32
End: 2022-09-25

## 2023-10-14 ENCOUNTER — HEALTH MAINTENANCE LETTER (OUTPATIENT)
Age: 33
End: 2023-10-14